# Patient Record
Sex: FEMALE | URBAN - METROPOLITAN AREA
[De-identification: names, ages, dates, MRNs, and addresses within clinical notes are randomized per-mention and may not be internally consistent; named-entity substitution may affect disease eponyms.]

---

## 2021-02-01 ENCOUNTER — APPOINTMENT (OUTPATIENT)
Dept: URBAN - METROPOLITAN AREA SURGERY 8 | Age: 51
Setting detail: DERMATOLOGY
End: 2021-02-01

## 2021-02-01 DIAGNOSIS — L81.6 OTHER DISORDERS OF DIMINISHED MELANIN FORMATION: ICD-10-CM

## 2021-02-01 PROCEDURE — 99202 OFFICE O/P NEW SF 15 MIN: CPT

## 2021-02-01 PROCEDURE — OTHER ADDITIONAL NOTES: OTHER

## 2021-02-01 PROCEDURE — OTHER COUNSELING: OTHER

## 2021-02-01 ASSESSMENT — LOCATION SIMPLE DESCRIPTION DERM: LOCATION SIMPLE: RIGHT HAND

## 2021-02-01 ASSESSMENT — LOCATION DETAILED DESCRIPTION DERM: LOCATION DETAILED: RIGHT RADIAL DORSAL HAND

## 2021-02-01 ASSESSMENT — LOCATION ZONE DERM: LOCATION ZONE: HAND

## 2021-02-01 NOTE — PROCEDURE: ADDITIONAL NOTES
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 discussed biopsy to confirm vs empiric trial of Elidel vs watchful waiting. elected for watchful waiting. instructed to call if worsens, enlarges, or develops new areas and will re-evaluate
Detail Level: Detailed
Render Risk Assessment In Note?: no

## 2021-02-01 NOTE — HPI: DISCOLORATION
How Severe Is Your Skin Discoloration?: mild
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 Notes first time injection was placed in wrong location and had to be referred out to ortho hand for injection

## 2021-12-20 ENCOUNTER — NURSE TRIAGE (OUTPATIENT)
Dept: OTHER | Facility: CLINIC | Age: 51
End: 2021-12-20